# Patient Record
Sex: MALE | URBAN - METROPOLITAN AREA
[De-identification: names, ages, dates, MRNs, and addresses within clinical notes are randomized per-mention and may not be internally consistent; named-entity substitution may affect disease eponyms.]

---

## 2021-07-26 ENCOUNTER — APPOINTMENT (RX ONLY)
Dept: URBAN - METROPOLITAN AREA CLINIC 174 | Facility: CLINIC | Age: 31
Setting detail: DERMATOLOGY
End: 2021-07-26

## 2021-07-26 DIAGNOSIS — L30.9 DERMATITIS, UNSPECIFIED: ICD-10-CM

## 2021-07-26 DIAGNOSIS — L23.9 ALLERGIC CONTACT DERMATITIS, UNSPECIFIED CAUSE: ICD-10-CM | Status: WELL CONTROLLED

## 2021-07-26 PROCEDURE — ? OTHER

## 2021-07-26 PROCEDURE — ? ORDER TESTS

## 2021-07-26 PROCEDURE — ? COUNSELING

## 2021-07-26 PROCEDURE — 99203 OFFICE O/P NEW LOW 30 MIN: CPT

## 2021-07-26 ASSESSMENT — LOCATION DETAILED DESCRIPTION DERM
LOCATION DETAILED: RIGHT ANTERIOR DISTAL UPPER ARM
LOCATION DETAILED: LEFT ANTERIOR DISTAL UPPER ARM
LOCATION DETAILED: RIGHT ANTERIOR PROXIMAL THIGH
LOCATION DETAILED: LEFT ANTERIOR PROXIMAL THIGH
LOCATION DETAILED: SUPERIOR LUMBAR SPINE

## 2021-07-26 ASSESSMENT — LOCATION SIMPLE DESCRIPTION DERM
LOCATION SIMPLE: RIGHT THIGH
LOCATION SIMPLE: LEFT THIGH
LOCATION SIMPLE: RIGHT UPPER ARM
LOCATION SIMPLE: LOWER BACK
LOCATION SIMPLE: LEFT UPPER ARM

## 2021-07-26 ASSESSMENT — LOCATION ZONE DERM
LOCATION ZONE: ARM
LOCATION ZONE: TRUNK
LOCATION ZONE: LEG

## 2021-07-26 NOTE — PROCEDURE: OTHER
Note Text (......Xxx Chief Complaint.): This diagnosis correlates with the
Other (Free Text): Patient states rash appeared a month and half ago. At time he had changed skin care products and was eating a high carb diet. Since he has discontinued the products and cut carbohydrates out of his diet. His rash has cleared, but his is interested in some testing to find out if it was related to gluten or skin care products.
Render Risk Assessment In Note?: no
Detail Level: Zone
Other (Free Text): Recommended patch testing with Dr. Bender or can follow up with allergy testing if interested.
Other (Free Text): Patient has a history of ACD to skin care products. Currently he has no rash. He will follow up with Dr. Bender for patch testing.
No

## 2021-07-29 ENCOUNTER — APPOINTMENT (RX ONLY)
Dept: URBAN - METROPOLITAN AREA CLINIC 158 | Facility: CLINIC | Age: 31
Setting detail: DERMATOLOGY
End: 2021-07-29

## 2021-07-29 DIAGNOSIS — L30.9 DERMATITIS, UNSPECIFIED: ICD-10-CM

## 2021-07-29 DIAGNOSIS — L259 CONTACT DERMATITIS AND OTHER ECZEMA, UNSPECIFIED CAUSE: ICD-10-CM

## 2021-07-29 PROBLEM — L23.9 ALLERGIC CONTACT DERMATITIS, UNSPECIFIED CAUSE: Status: ACTIVE | Noted: 2021-07-29

## 2021-07-29 PROCEDURE — ? COUNSELING

## 2021-07-29 PROCEDURE — ? DEFER

## 2021-07-29 PROCEDURE — 99213 OFFICE O/P EST LOW 20 MIN: CPT

## 2021-07-29 PROCEDURE — ? PATIENT SPECIFIC COUNSELING

## 2021-07-29 PROCEDURE — ? OTHER

## 2021-07-29 ASSESSMENT — LOCATION SIMPLE DESCRIPTION DERM
LOCATION SIMPLE: LEFT THIGH
LOCATION SIMPLE: RIGHT THIGH
LOCATION SIMPLE: LOWER BACK
LOCATION SIMPLE: RIGHT UPPER ARM
LOCATION SIMPLE: LEFT UPPER ARM

## 2021-07-29 ASSESSMENT — LOCATION DETAILED DESCRIPTION DERM
LOCATION DETAILED: SUPERIOR LUMBAR SPINE
LOCATION DETAILED: RIGHT ANTERIOR PROXIMAL THIGH
LOCATION DETAILED: RIGHT ANTERIOR DISTAL UPPER ARM
LOCATION DETAILED: LEFT ANTERIOR DISTAL UPPER ARM
LOCATION DETAILED: LEFT ANTERIOR PROXIMAL THIGH

## 2021-07-29 ASSESSMENT — LOCATION ZONE DERM
LOCATION ZONE: LEG
LOCATION ZONE: TRUNK
LOCATION ZONE: ARM

## 2021-07-29 NOTE — PROCEDURE: DEFER
Instructions (Optional): Will get approval for patch testing fro his insurance provider. NA 80 allergen standard patch test series (80 patches).
Introduction Text (Please End With A Colon): :
Detail Level: Detailed

## 2021-07-29 NOTE — PROCEDURE: PATIENT SPECIFIC COUNSELING
Detail Level: Detailed
he gives a history of having problems with skin products in the past. He has had allergy prick testing but never patch testing for allergic contact dermatitis. He would like to undergo testing in hopes of avoiding contact allergens in the future

## 2021-07-29 NOTE — PROCEDURE: OTHER
Note Text (......Xxx Chief Complaint.): This diagnosis correlates with the
Other (Free Text): FROM VISIT 1 MONTH AGO:Patient states rash appeared a month and half ago. At time he had changed skin care products and was eating a high carb diet. Since he has discontinued the products and cut carbohydrates out of his diet. His rash has cleared, but his is interested in some testing to find out if it was related to gluten or skin care products\\n\\nUPDATE 7/29/2021: Kay ordered transgluaminase IgA antibody at his visit 1 month ago but he has not yet had the test done.\\n\\nToday, he gives additional Hx: Covid 7/2020, bed bound for 2 months, unable to eat properly, weight went from 230 down to 200. When he felt better, began eating in excess and by 2/2021 weight was up to 270 and climbed to 290. Suffering also with constipation and intermittent diarrhea.\\n\\nOne month ago, he began a high lean protein (chicken breast), low carb, low fat diet eating several small meals per day, and lost 15 pounds in the first 2 weeks, then stalled.\\nSuggested he begin learning (online) from Dr Ankit Mendez about optimal strategies for weight loss and optimization of metabolic health.\\n\\nOf note, dermatitis has not flared
Render Risk Assessment In Note?: no
Detail Level: Zone

## 2021-08-16 ENCOUNTER — APPOINTMENT (RX ONLY)
Dept: URBAN - METROPOLITAN AREA CLINIC 158 | Facility: CLINIC | Age: 31
Setting detail: DERMATOLOGY
End: 2021-08-16

## 2021-08-16 DIAGNOSIS — L259 CONTACT DERMATITIS AND OTHER ECZEMA, UNSPECIFIED CAUSE: ICD-10-CM

## 2021-08-16 PROBLEM — L23.9 ALLERGIC CONTACT DERMATITIS, UNSPECIFIED CAUSE: Status: ACTIVE | Noted: 2021-08-16

## 2021-08-16 PROCEDURE — 95044 PATCH/APPLICATION TESTS: CPT

## 2021-08-16 PROCEDURE — ? PATIENT SPECIFIC COUNSELING

## 2021-08-16 PROCEDURE — ? PATCH TESTING

## 2021-08-16 NOTE — PROCEDURE: PATCH TESTING
Detail Level: Zone
Consent: Written consent obtained, risks reviewed including but not limited to rash, itching, allergic reaction, systemic rash, remote possiblity of anaphylaxis to allergen.
Post-Care Instructions: I reviewed with the patient in detail post-care instructions. Patient should not sweat, pick at, or get the patches wet for 48 hours.
Number Of Patches (Maximum Allowable Per Dos By Cms Is 90): 80

## 2021-08-18 ENCOUNTER — APPOINTMENT (RX ONLY)
Dept: URBAN - METROPOLITAN AREA CLINIC 158 | Facility: CLINIC | Age: 31
Setting detail: DERMATOLOGY
End: 2021-08-18

## 2021-08-18 DIAGNOSIS — L259 CONTACT DERMATITIS AND OTHER ECZEMA, UNSPECIFIED CAUSE: ICD-10-CM

## 2021-08-18 PROBLEM — L23.9 ALLERGIC CONTACT DERMATITIS, UNSPECIFIED CAUSE: Status: ACTIVE | Noted: 2021-08-18

## 2021-08-18 PROCEDURE — ? NORTH AMERICAN 80 PATCH TEST READING

## 2021-08-18 PROCEDURE — ? PATIENT SPECIFIC COUNSELING

## 2021-08-18 PROCEDURE — 99212 OFFICE O/P EST SF 10 MIN: CPT

## 2021-08-18 NOTE — PROCEDURE: [place] [employer] 80 PATCH TEST READING
Allergen 39 Reaction: no reaction
Name Of Allergen 35: Chloroxylenol (PCMX) / (4-Chloro-3.5-xylenol (PCMX))
Name Of Allergen 21: 2.5-diazolidinylurea (Karon arthur)
Name Of Allergen 40: Glyceryl monothiogylcolate (GMTG)
Name Of Allergen 69: Disucaine hydrochloride
Name Of Allergen 44: Tixocortol-21-pivalate
Name Of Allergen 52: Benzyl salicylate
Name Of Allergen 26: Paraben Mix
Name Of Allergen 13: Epoxy resin, Bisphenol A
Name Of Allergen 57: Cananga odorata oil / (Ylang-Ylang oil)
Name Of Allergen 47: Triethanolamine
Name Of Allergen 5: Imidazolidinyl urea (Germall 115)
Name Of Allergen 31: Sesquiterpene lactone mix
Name Of Allergen 73: Octyl Salicylate
Name Of Allergen 32: Thimerosal (Merthiolate)
Name Of Allergen 18: Potassium dichromate
Name Of Allergen 66: Compositae Mix ll
Name Of Allergen 37: 5-zzwg-Zoool-4-methoxyphenol (BHA)
Name Of Allergen 78: Methylisothiazoliinone+Methychloroisothiazolinone / (5-Chloro-methyl-7-vrmffiynufxy-6qpu(Radha))
Name Of Allergen 62: Polysorbate 80 / (Polyoxyethylenesorbitanmonooleate(Tween 80))
Name Of Allergen 74: Benzalkonium chloride
Name Of Allergen 58: Benzyl alcohol
Name Of Allergen 33: Propolis
Name Of Allergen 63: Iodoprorynyl butyl carbamate
What Reading Time Point?: 48 hour
Name Of Allergen 19: Myroxylon pereirae resin / (Balsam [place])
Name Of Allergen 67: Lidocaine
Name Of Allergen 79: Propylene glycol
Name Of Allergen 38: Goldsodiumthiosulfate
Name Of Allergen 3: Colophonium / (Colophony)
Name Of Allergen 11: Clobetasol-17-propionate
Name Of Allergen 24: Mixed dialkyl thiourea
Name Of Allergen 43: Cobalt(ll) chloride hexahydrate
Number Of Patches Read: 80
Name Of Allergen 55: Hema 2-Hydroxyethyl methacrylate
Name Of Allergen 29: Glutaral / (Glutaraldehyde)
Name Of Allergen 71: Isoamyl-p-methoxycinnamate
Name Of Allergen 60: Triclosan (Irgasan )
Name Of Allergen 76: Cocamidopropylbetaine
Name Of Allergen 16: Mercapto mix
Name Of Allergen 50: Fragrance Mix ll
Name Of Allergen 8: Carba mix
Name Of Allergen 4: 4-Phenylenediamine base
Name Of Allergen 56: DMDM Hydantoin
Name Of Allergen 12: Ethylenediamine dihydrochloride
Name Of Allergen 25: Disperse Orange 3
Name Of Allergen 46: Cocamide SHARA / (coconut diethanolamide)
Name Of Allergen 72: Lyral
Name Of Allergen 51: Disperse Yellow 3
Name Of Allergen 17: N-Isopropyl-N-phenyl-4-phenylenediamine
Name Of Allergen 30: 2-Bromo-2-nitropropane-l,3-diol (Bronopol)
Show Allergen Counseling In The Note?: No
Name Of Allergen 61: Desoximethasone
Name Of Allergen 36: Ethyleneurea, melamine formaldehyde mix
Name Of Allergen 77: Formaldehyde
Name Of Allergen 65: Disperse Blue 106/124 Mix
Name Of Allergen 9: Neomycin sulfate
Name Of Allergen 1: Benzocaine
Name Of Allergen 53: Lauryl glucoside
Name Of Allergen 22: DL-alpha-tocopherol
Name Of Allergen 70: Benzoylperoxide
Name Of Allergen 45: Budesonide
Name Of Allergen 41: Toluenesulfaonamide formaldehyde resin (Tosylamide)
Detail Level: Zone
Name Of Allergen 48: Hydrocortisone-17-butyrate
Name Of Allergen 14: Quaternium 15, Dowicil 200 / (1-(3-chloroallyl)3,5,8-qdzaig-2-azonia-adamantane)-chloride))
Name Of Allergen 6: Cinnamal / (Cinnamic aldehyde)
Name Of Allergen 27: Methyldibromoglutaronitrile (MDBGN)
Name Of Allergen 10: Thiuram mix
Name Of Allergen 23: Bacitracin
Name Of Allergen 54: Triamcinolone acetonide
Name Of Allergen 42: Methyl methacrylate
Name Of Allergen 2: 2-Mercaptobenzothiazole
Name Of Allergen 15: 9-alya-Iqiyxpphlxnghfriccxety resin
Name Of Allergen 7: Amerchol L 101
Name Of Allergen 59: Isopropyl myristate
Name Of Allergen 49: Tea Tree Oil
Name Of Allergen 28: Fragrance Mix l
Name Of Allergen 75: Amidoamine
Name Of Allergen 34: Benzophenone-3 / (2-Hydroxy-4methoxybenzophenone)
Name Of Allergen 20: Nickelsulfate hexahydrate
Name Of Allergen 64: 2-n-Octyl-4-pjciylpbzatsa-3-one
Name Of Allergen 80: Dimethylol dihyroxyethyleneurea (Fix.CPN)
Name Of Allergen 39: Ethyl acrylate
Show Negative Results In The Note?: Yes
Name Of Allergen 68: Fusidic Acid sodium salt

## 2021-08-19 ENCOUNTER — APPOINTMENT (RX ONLY)
Dept: URBAN - METROPOLITAN AREA CLINIC 158 | Facility: CLINIC | Age: 31
Setting detail: DERMATOLOGY
End: 2021-08-19

## 2021-08-19 DIAGNOSIS — L30.9 DERMATITIS, UNSPECIFIED: ICD-10-CM

## 2021-08-19 DIAGNOSIS — L259 CONTACT DERMATITIS AND OTHER ECZEMA, UNSPECIFIED CAUSE: ICD-10-CM

## 2021-08-19 DIAGNOSIS — Q828 OTHER SPECIFIED ANOMALIES OF SKIN: ICD-10-CM

## 2021-08-19 DIAGNOSIS — Q819 OTHER SPECIFIED ANOMALIES OF SKIN: ICD-10-CM

## 2021-08-19 DIAGNOSIS — Q826 OTHER SPECIFIED ANOMALIES OF SKIN: ICD-10-CM

## 2021-08-19 PROBLEM — L85.8 OTHER SPECIFIED EPIDERMAL THICKENING: Status: ACTIVE | Noted: 2021-08-19

## 2021-08-19 PROBLEM — L23.9 ALLERGIC CONTACT DERMATITIS, UNSPECIFIED CAUSE: Status: ACTIVE | Noted: 2021-08-19

## 2021-08-19 PROCEDURE — 99212 OFFICE O/P EST SF 10 MIN: CPT

## 2021-08-19 PROCEDURE — ? COUNSELING

## 2021-08-19 PROCEDURE — ? NORTH AMERICAN 80 PATCH TEST READING

## 2021-08-19 PROCEDURE — ? OTHER

## 2021-08-19 PROCEDURE — ? PATIENT SPECIFIC COUNSELING

## 2021-08-19 ASSESSMENT — LOCATION DETAILED DESCRIPTION DERM
LOCATION DETAILED: RIGHT PROXIMAL LATERAL POSTERIOR UPPER ARM
LOCATION DETAILED: LEFT PROXIMAL POSTERIOR UPPER ARM

## 2021-08-19 ASSESSMENT — LOCATION SIMPLE DESCRIPTION DERM
LOCATION SIMPLE: LEFT UPPER ARM
LOCATION SIMPLE: RIGHT UPPER ARM

## 2021-08-19 ASSESSMENT — LOCATION ZONE DERM: LOCATION ZONE: ARM

## 2021-08-19 NOTE — PROCEDURE: OTHER
Note Text (......Xxx Chief Complaint.): This diagnosis correlates with the
Other (Free Text): FROM VISIT 1 MONTH AGO: Patient states rash appeared (arms, legs, and trunk) a month and half ago. At time he had changed skin care products and was eating a high carb diet. Since he has discontinued the products and cut carbohydrates out of his diet. His rash has cleared, but his is interested in some testing to find out if it was related to gluten or skin care products\\n\\nUPDATE 7/29/2021: \\Lisette ordered transgluaminase IgA antibody at his visit 1 month ago but he has not yet had the test done.\\nToday, he gives additional Hx: Covid 7/2020, bed bound for 2 months, unable to eat properly, weight went from 230 down to 200. When he felt better, began eating in excess and by 2/2021 weight was up to 270 and climbed to 290. Suffering also with constipation and intermittent diarrhea.\\nOne month ago, he began a high lean protein (chicken breast), low carb, low fat diet eating several small meals per day, and lost 15 pounds in the first 2 weeks, then stalled.\\nSuggested he begin learning (online) from Dr Ankit Mendez about optimal strategies for weight loss and optimization of metabolic health.\\nOf note, dermatitis has not flared.\\n\\nUPDATE 8/19/2021:\\Patience above, patch testing is negative, thus it is presumed that the prior dermatitis was not due to ACD (at least not due to any of the 80 allergens tested).\\nMost likely, he has a genetic tendency for atopic dermatitis and his episode of dermatitis was triggered by poor metabolic health/gut health, which he has now improved with diet change. Encouraged him to continue on the goal of optimization of health through diet and physical movement
Render Risk Assessment In Note?: no
Detail Level: Zone

## 2021-08-19 NOTE — PROCEDURE: [place] 80 PATCH TEST READING
Allergen 39 Reaction: no reaction
Name Of Allergen 35: Chloroxylenol (PCMX) / (4-Chloro-3.5-xylenol (PCMX))
Name Of Allergen 21: 2.5-diazolidinylurea (Karon arthur)
Name Of Allergen 40: Glyceryl monothiogylcolate (GMTG)
Name Of Allergen 69: Disucaine hydrochloride
Name Of Allergen 44: Tixocortol-21-pivalate
Name Of Allergen 52: Benzyl salicylate
Name Of Allergen 26: Paraben Mix
Name Of Allergen 13: Epoxy resin, Bisphenol A
Name Of Allergen 57: Cananga odorata oil / (Ylang-Ylang oil)
Name Of Allergen 47: Triethanolamine
Name Of Allergen 5: Imidazolidinyl urea (Germall 115)
Name Of Allergen 31: Sesquiterpene lactone mix
Name Of Allergen 73: Octyl Salicylate
Name Of Allergen 32: Thimerosal (Merthiolate)
Name Of Allergen 18: Potassium dichromate
Name Of Allergen 66: Compositae Mix ll
Name Of Allergen 37: 0-wnjr-Wvojm-4-methoxyphenol (BHA)
Name Of Allergen 78: Methylisothiazoliinone+Methychloroisothiazolinone / (5-Chloro-methyl-4-vzwocfarlwxj-1hba(Radha))
Name Of Allergen 62: Polysorbate 80 / (Polyoxyethylenesorbitanmonooleate(Tween 80))
Name Of Allergen 74: Benzalkonium chloride
Name Of Allergen 58: Benzyl alcohol
Name Of Allergen 33: Propolis
Name Of Allergen 63: Iodoprorynyl butyl carbamate
What Reading Time Point?: 72 hour
Name Of Allergen 19: Myroxylon pereirae resin / (Balsam [place])
Name Of Allergen 67: Lidocaine
Name Of Allergen 79: Propylene glycol
Name Of Allergen 38: Goldsodiumthiosulfate
Name Of Allergen 3: Colophonium / (Colophony)
Name Of Allergen 11: Clobetasol-17-propionate
Name Of Allergen 24: Mixed dialkyl thiourea
Name Of Allergen 43: Cobalt(ll) chloride hexahydrate
Number Of Patches Read: 80
Name Of Allergen 55: Hema 2-Hydroxyethyl methacrylate
Name Of Allergen 29: Glutaral / (Glutaraldehyde)
Name Of Allergen 71: Isoamyl-p-methoxycinnamate
Name Of Allergen 60: Triclosan (Irgasan )
Name Of Allergen 76: Cocamidopropylbetaine
Name Of Allergen 16: Mercapto mix
Name Of Allergen 50: Fragrance Mix ll
Name Of Allergen 8: Carba mix
Name Of Allergen 4: 4-Phenylenediamine base
Name Of Allergen 56: DMDM Hydantoin
Name Of Allergen 12: Ethylenediamine dihydrochloride
Name Of Allergen 25: Disperse Orange 3
Name Of Allergen 46: Cocamide SHARA / (coconut diethanolamide)
Name Of Allergen 72: Lyral
Name Of Allergen 51: Disperse Yellow 3
Name Of Allergen 17: N-Isopropyl-N-phenyl-4-phenylenediamine
Name Of Allergen 30: 2-Bromo-2-nitropropane-l,3-diol (Bronopol)
Show Allergen Counseling In The Note?: No
Name Of Allergen 61: Desoximethasone
Name Of Allergen 36: Ethyleneurea, melamine formaldehyde mix
Name Of Allergen 77: Formaldehyde
Name Of Allergen 65: Disperse Blue 106/124 Mix
Name Of Allergen 9: Neomycin sulfate
Name Of Allergen 1: Benzocaine
Name Of Allergen 53: Lauryl glucoside
Name Of Allergen 22: DL-alpha-tocopherol
Name Of Allergen 70: Benzoylperoxide
Name Of Allergen 45: Budesonide
Name Of Allergen 41: Toluenesulfaonamide formaldehyde resin (Tosylamide)
Detail Level: Zone
Name Of Allergen 48: Hydrocortisone-17-butyrate
Name Of Allergen 14: Quaternium 15, Dowicil 200 / (1-(3-chloroallyl)3,5,2-majfad-7-azonia-adamantane)-chloride))
Name Of Allergen 6: Cinnamal / (Cinnamic aldehyde)
Name Of Allergen 27: Methyldibromoglutaronitrile (MDBGN)
Name Of Allergen 10: Thiuram mix
Name Of Allergen 23: Bacitracin
Name Of Allergen 54: Triamcinolone acetonide
Name Of Allergen 42: Methyl methacrylate
Name Of Allergen 2: 2-Mercaptobenzothiazole
Name Of Allergen 15: 1-ghdf-Rgwsphqupyzfwkbahpoomx resin
Name Of Allergen 7: Amerchol L 101
Name Of Allergen 59: Isopropyl myristate
Name Of Allergen 49: Tea Tree Oil
Name Of Allergen 28: Fragrance Mix l
Name Of Allergen 75: Amidoamine
Name Of Allergen 34: Benzophenone-3 / (2-Hydroxy-4methoxybenzophenone)
Name Of Allergen 20: Nickelsulfate hexahydrate
Name Of Allergen 64: 2-n-Octyl-0-xwdvdyulpjdwk-4-one
Name Of Allergen 80: Dimethylol dihyroxyethyleneurea (Fix.CPN)
Name Of Allergen 39: Ethyl acrylate
Show Negative Results In The Note?: Yes
Name Of Allergen 68: Fusidic Acid sodium salt

## 2023-12-07 ENCOUNTER — OFFICE VISIT (OUTPATIENT)
Facility: LOCATION | Age: 33
End: 2023-12-07
Payer: COMMERCIAL

## 2023-12-07 DIAGNOSIS — H04.123 TEAR FILM INSUFFICIENCY OF BILATERAL LACRIMAL GLANDS: Primary | ICD-10-CM

## 2023-12-07 PROCEDURE — 92004 COMPRE OPH EXAM NEW PT 1/>: CPT | Performed by: OPTOMETRIST

## 2023-12-07 ASSESSMENT — INTRAOCULAR PRESSURE
OD: 12
OS: 13

## 2023-12-07 ASSESSMENT — VISUAL ACUITY
OD: 20/20
OS: 20/20

## 2023-12-14 ENCOUNTER — OFFICE VISIT (OUTPATIENT)
Facility: LOCATION | Age: 33
End: 2023-12-14
Payer: COMMERCIAL

## 2023-12-14 PROCEDURE — 92310 CONTACT LENS FITTING OU: CPT | Performed by: OPTOMETRIST

## 2023-12-14 PROCEDURE — 92014 COMPRE OPH EXAM EST PT 1/>: CPT | Performed by: OPTOMETRIST

## 2023-12-14 ASSESSMENT — VISUAL ACUITY
OD: 20/20
OS: 20/20

## 2024-01-18 ENCOUNTER — OFFICE VISIT (OUTPATIENT)
Facility: LOCATION | Age: 34
End: 2024-01-18

## 2024-01-18 DIAGNOSIS — H52.13 MYOPIA, BILATERAL: Primary | ICD-10-CM

## 2024-01-18 PROCEDURE — 92310 CONTACT LENS FITTING OU: CPT
